# Patient Record
Sex: MALE | Race: WHITE | URBAN - METROPOLITAN AREA
[De-identification: names, ages, dates, MRNs, and addresses within clinical notes are randomized per-mention and may not be internally consistent; named-entity substitution may affect disease eponyms.]

---

## 2017-11-01 ENCOUNTER — FOLLOW UP ESTABLISHED (OUTPATIENT)
Dept: URBAN - METROPOLITAN AREA CLINIC 64 | Facility: CLINIC | Age: 72
End: 2017-11-01
Payer: MEDICARE

## 2017-11-01 DIAGNOSIS — H02.831 DERMATOCHALASIS OF RIGHT UPPER LID: Primary | ICD-10-CM

## 2017-11-01 DIAGNOSIS — H02.834 DERMATOCHALASIS OF LEFT UPPER LID: ICD-10-CM

## 2017-11-01 DIAGNOSIS — H52.13 MYOPIA, BILATERAL: ICD-10-CM

## 2017-11-01 PROCEDURE — 92014 COMPRE OPH EXAM EST PT 1/>: CPT | Performed by: OPTOMETRIST

## 2017-11-01 PROCEDURE — 92015 DETERMINE REFRACTIVE STATE: CPT | Performed by: OPTOMETRIST

## 2017-11-01 ASSESSMENT — INTRAOCULAR PRESSURE
OS: 13
OD: 11

## 2017-11-01 ASSESSMENT — VISUAL ACUITY
OS: 20/20
OD: 20/30

## 2017-11-01 ASSESSMENT — KERATOMETRY
OS: 43.38
OD: 42.87

## 2017-11-29 ENCOUNTER — FOLLOW UP ESTABLISHED (OUTPATIENT)
Dept: URBAN - METROPOLITAN AREA CLINIC 10 | Facility: CLINIC | Age: 72
End: 2017-11-29
Payer: MEDICARE

## 2017-11-29 DIAGNOSIS — H02.31 BLEPHAROCHALASIS OF RIGHT UPPER LID: Primary | ICD-10-CM

## 2017-11-29 DIAGNOSIS — Z41.1 ENCOUNTER FOR COSMETIC SURGERY: ICD-10-CM

## 2017-11-29 DIAGNOSIS — H02.34 BLEPHAROCHALASIS OF LEFT UPPER LID: ICD-10-CM

## 2017-11-29 PROCEDURE — 92285 EXTERNAL OCULAR PHOTOGRAPHY: CPT | Performed by: OPHTHALMOLOGY

## 2017-11-29 PROCEDURE — 99214 OFFICE O/P EST MOD 30 MIN: CPT | Performed by: OPHTHALMOLOGY

## 2017-11-29 PROCEDURE — 92081 LIMITED VISUAL FIELD XM: CPT | Performed by: OPHTHALMOLOGY

## 2017-11-29 RX ORDER — AMLODIPINE BESYLATE 2.5 MG/1
2.5 MG TABLET ORAL
Qty: 1 | Refills: 0 | Status: ACTIVE
Start: 2017-11-29

## 2017-11-29 RX ORDER — NEOMYCIN SULFATE, POLYMYXIN B SULFATE AND DEXAMETHASONE 3.5; 10000; 1 MG/G; [USP'U]/G; MG/G
OINTMENT OPHTHALMIC
Qty: 1 | Refills: 1 | Status: ACTIVE
Start: 2017-11-29

## 2022-04-29 ENCOUNTER — OFFICE VISIT (OUTPATIENT)
Dept: URBAN - METROPOLITAN AREA CLINIC 64 | Facility: CLINIC | Age: 77
End: 2022-04-29
Payer: MEDICARE

## 2022-04-29 DIAGNOSIS — H43.393 OTHER VITREOUS OPACITIES, BILATERAL: Primary | ICD-10-CM

## 2022-04-29 DIAGNOSIS — H52.223 REGULAR ASTIGMATISM, BILATERAL: ICD-10-CM

## 2022-04-29 DIAGNOSIS — H16.223 KERATOCONJUNCTIVITIS SICCA, BILATERAL: ICD-10-CM

## 2022-04-29 PROCEDURE — 99204 OFFICE O/P NEW MOD 45 MIN: CPT | Performed by: OPTOMETRIST

## 2022-04-29 ASSESSMENT — VISUAL ACUITY
OS: 20/30
OD: 20/40

## 2022-04-29 ASSESSMENT — INTRAOCULAR PRESSURE
OD: 22
OS: 19

## 2022-04-29 NOTE — IMPRESSION/PLAN
Impression: Keratoconjunctivitis sicca, bilateral: [id number]. Plan: Patient's complaint is consistent with dry eye syndrome. There is no evidence of permanent changes to the cornea. Explained there are ways to improve the symptoms but no permanent fix for the symptoms. Reviewed options including topical treatments, punctal plugs and permanent punctal occlusion.

## 2023-04-26 ENCOUNTER — OFFICE VISIT (OUTPATIENT)
Dept: URBAN - METROPOLITAN AREA CLINIC 64 | Facility: CLINIC | Age: 78
End: 2023-04-26
Payer: OTHER GOVERNMENT

## 2023-04-26 DIAGNOSIS — H52.4 PRESBYOPIA: ICD-10-CM

## 2023-04-26 DIAGNOSIS — Z96.1 PRESENCE OF INTRAOCULAR LENS: ICD-10-CM

## 2023-04-26 DIAGNOSIS — H43.393 OTHER VITREOUS OPACITIES, BILATERAL: Primary | ICD-10-CM

## 2023-04-26 PROCEDURE — 99214 OFFICE O/P EST MOD 30 MIN: CPT | Performed by: OPTOMETRIST

## 2023-04-26 ASSESSMENT — KERATOMETRY
OS: 42.09
OD: 43.52

## 2023-04-26 ASSESSMENT — VISUAL ACUITY
OS: 20/40
OD: 20/25

## 2023-04-26 ASSESSMENT — INTRAOCULAR PRESSURE
OS: 17
OD: 19